# Patient Record
Sex: FEMALE | Race: WHITE | NOT HISPANIC OR LATINO | URBAN - METROPOLITAN AREA
[De-identification: names, ages, dates, MRNs, and addresses within clinical notes are randomized per-mention and may not be internally consistent; named-entity substitution may affect disease eponyms.]

---

## 2018-12-21 DIAGNOSIS — O09.299 HISTORY OF CERVICAL INCOMPETENCE IN PREGNANCY, CURRENTLY PREGNANT: Primary | ICD-10-CM

## 2019-01-09 ENCOUNTER — OFFICE VISIT (OUTPATIENT)
Dept: MATERNAL FETAL MEDICINE | Facility: CLINIC | Age: 24
End: 2019-01-09
Payer: OTHER GOVERNMENT

## 2019-01-09 VITALS
HEIGHT: 65 IN | WEIGHT: 138 LBS | DIASTOLIC BLOOD PRESSURE: 62 MMHG | BODY MASS INDEX: 22.99 KG/M2 | SYSTOLIC BLOOD PRESSURE: 101 MMHG

## 2019-01-09 DIAGNOSIS — Z36.89 ENCOUNTER FOR FETAL ANATOMIC SURVEY: Primary | ICD-10-CM

## 2019-01-09 DIAGNOSIS — O09.299 HISTORY OF CERVICAL INCOMPETENCE IN PREGNANCY, CURRENTLY PREGNANT: ICD-10-CM

## 2019-01-09 PROCEDURE — 99202 OFFICE O/P NEW SF 15 MIN: CPT | Mod: 25,,, | Performed by: OBSTETRICS & GYNECOLOGY

## 2019-01-09 PROCEDURE — 76813 OB US NUCHAL MEAS 1 GEST: CPT | Mod: ,,, | Performed by: OBSTETRICS & GYNECOLOGY

## 2019-01-09 PROCEDURE — 76813 PR US, OB NUCHAL, TRANSABDOM/TRANSVAG, FIRST GESTATION: ICD-10-PCS | Mod: ,,, | Performed by: OBSTETRICS & GYNECOLOGY

## 2019-01-09 PROCEDURE — 99202 PR OFFICE/OUTPT VISIT, NEW, LEVL II, 15-29 MIN: ICD-10-PCS | Mod: 25,,, | Performed by: OBSTETRICS & GYNECOLOGY

## 2019-01-09 RX ORDER — DOXYLAMINE SUCCINATE 25 MG/1
TABLET ORAL
COMMUNITY
Start: 2018-11-19

## 2019-01-09 RX ORDER — ALBUTEROL SULFATE 90 UG/1
AEROSOL, METERED RESPIRATORY (INHALATION)
COMMUNITY
Start: 2018-11-19

## 2019-01-09 RX ORDER — CHOLECALCIFEROL (VITAMIN D3) 50 MCG
TABLET ORAL
COMMUNITY
Start: 2018-11-19

## 2019-01-09 NOTE — LETTER
January 9, 2019      Estrella Cabral, CNM  [address] [facility]  [place] [address]           [place] [facility]  [address] , [address]  [place] [address]  Phone: [phone]          Patient: Carolyne Fry   MR Number: [id number]   YOB: 1995   Date of Visit: 1/9/2019       Dear Estrella Cabral:    Thank you for referring Carolyne Fry to me for evaluation. Attached you will find relevant portions of my assessment and plan of care.    If you have questions, please do not hesitate to call me. I look forward to following Carolyne Fry along with you.    Sincerely,    Rivka Lim MD    Enclosure  CC:  No Recipients    If you would like to receive this communication electronically, please contact [email or web address] or [phone] to request more information on [employer] Link access.    For providers and/or their staff who would like to refer a patient to [facility], please contact us through our one-stop-shop provider referral line, [facility] , at [phone].    If you feel you have received this communication in error or would no longer like to receive these types of communications, please e-mail [email or web address]

## 2019-01-09 NOTE — PROGRESS NOTES
"Indication  ========    Consultation: prior cerclage.    History  ======    General History  Blood group: A  Rhesus: Rh positive  Previous Outcomes  Preg. no. 1  Outcome: Live YOB: 2015  Gest. age 42 w + 0 d  Gender: female  Details: c/s, FTP, Cerclage placed at 16 weeks, blood transfusion, PP hemorrhage   2  Para 1  Crump children born living (T) 1  Crump children born (T) 1  Crump living children (L) 1  Risk Factors  History risk factors: Asthma  Details: hx of depression  Details: hx of cerclage with prior pregnancy, PP hemorrhage  Details: Thyroid disease  Details: seizures    Pregnancy History  ==============    Maternal Lab Tests  Result: being done at [facility]    Maternal Assessment  =================    Weight 63 kg  Weight (lb) 138 lb  BP syst 101 mmHg  BP diast 62 mmHg    Method  ======    Transabdominal ultrasound examination, 2D Color Doppler, Malvin iU22. View: Good view.    Pregnancy  =========    Crump pregnancy. Number of fetuses: 1.    Dating  ======    GA by "stated dating" 13 w + 4 d  BARB by "stated dating": 2019  Ultrasound examination on: 2019  GA by U/S based upon: CRL  GA by U/S 14 w + 2 d  BARB by U/S: 2019  Assigned: Dating performed on 2019, based on the external assessment  Assigned GA 13 w + 4 d  Assigned BARB: 2019    General Evaluation  ==============    Cardiac activity: present.    Fetal Biometry  ============    CRL 83.4 mm 14w 2d Hadlock   bpm  Other: too late to perform Nuchal Translucency    Fetal Anatomy  ===========    The following structures appear normal:  Cranium.    The following structures were visualized:  Arms. Legs.    Maternal Structures  ===============    Uterus / Cervix  Cervical length 62.0 mm    Consultation  ==========    138#  5'5"    PNV, vit D, Unisom, albuterol  PMH: Asthma, depression, PPH (2 units prbc during CD), thyroid, ? seizures  PSH: CD, cerclage  SH: Neg  FH: Denies  [place]  OB:  -CS at 42 weeks for FTD past 4-5 cm with fetal bradycardia.  Early in pregnancy (late first trimester), Dr. Wilcox followed cervical lengths and was concerned about funneling. Advised for cerclage which  was placed at 14 weeks per op note. Per op note, patient was noted to be 1 cm dilated. Patient thinks cerclage was placed at 16 weeks. Had   contractions during pregnancy but never labored through cerclage. Delivered post-dates and was sectioned for failure to progress.  Discussed if her pregnancy was complicated by funneling and second trimester cervical dilation, she most likely has cervical insufficiency.  Patient inquired about progesterone. As she did not deliver  and the cerclage did not require  removal for labor or rupture of  membranes, I do not think she needs progesterone therapy. Patient counseled re: history indicated cerclage and the risks of pain, bleeding,  infection, damage to adjacent structures, damage to cervix, risk of PROM, and potential failure to prevent a  delivery. She has lots of  concerns re: anesthesia and required general anesthesia. Discussed she will be assessed by anesthesia on day of surgery but regional  anesthesia will be safest most likely.  Recommend pelvic rest and cerclage removal at 36-37 weeks or earlier if indicated.  Time  I overall spent approximately 20 minutes in face to face time with the patient and her family, greater than 50% of which was in counseling and  care coordination.    Impression  =========    Crump, living IUP.  CRL measures consistent with EDC.  NT cannot be measured due to CRL measurements.    Recommendation  ==============    Recommend integrated screen with primary OB.  Patient scheduled for history indicated cerclage placement.  MFM will perform anatomy survey at 19 weeks.

## 2019-01-16 ENCOUNTER — HOSPITAL ENCOUNTER (OUTPATIENT)
Facility: OTHER | Age: 24
Discharge: HOME | End: 2019-01-16
Attending: OBSTETRICS & GYNECOLOGY | Admitting: OBSTETRICS & GYNECOLOGY
Payer: OTHER GOVERNMENT

## 2019-01-16 VITALS
SYSTOLIC BLOOD PRESSURE: 97 MMHG | BODY MASS INDEX: 22.99 KG/M2 | TEMPERATURE: 98 F | WEIGHT: 138 LBS | HEART RATE: 62 BPM | OXYGEN SATURATION: 99 % | DIASTOLIC BLOOD PRESSURE: 58 MMHG | HEIGHT: 65 IN | RESPIRATION RATE: 16 BRPM

## 2019-01-16 DIAGNOSIS — O34.30 CERVICAL INSUFFICIENCY IN PREGNANCY, ANTEPARTUM: Primary | ICD-10-CM

## 2019-01-16 DIAGNOSIS — O34.30 CERVICAL INSUFFICIENCY IN PREGNANCY, ANTEPARTUM: ICD-10-CM

## 2019-01-16 LAB
ABO + RH BLD: NORMAL
BASOPHILS # BLD AUTO: 0.01 K/UL
BASOPHILS NFR BLD: 0.2 %
BLD GP AB SCN CELLS X3 SERPL QL: NORMAL
DIFFERENTIAL METHOD: ABNORMAL
EOSINOPHIL # BLD AUTO: 0 K/UL
EOSINOPHIL NFR BLD: 0.6 %
ERYTHROCYTE [DISTWIDTH] IN BLOOD BY AUTOMATED COUNT: 13 %
HCT VFR BLD AUTO: 35.2 %
HGB BLD-MCNC: 11.8 G/DL
LYMPHOCYTES # BLD AUTO: 1.3 K/UL
LYMPHOCYTES NFR BLD: 27 %
MCH RBC QN AUTO: 31.7 PG
MCHC RBC AUTO-ENTMCNC: 33.5 G/DL
MCV RBC AUTO: 95 FL
MONOCYTES # BLD AUTO: 0.4 K/UL
MONOCYTES NFR BLD: 8 %
NEUTROPHILS # BLD AUTO: 3.1 K/UL
NEUTROPHILS NFR BLD: 64 %
PLATELET # BLD AUTO: 138 K/UL
PMV BLD AUTO: 10.5 FL
RBC # BLD AUTO: 3.72 M/UL
WBC # BLD AUTO: 4.77 K/UL

## 2019-01-16 PROCEDURE — S0028 INJECTION, FAMOTIDINE, 20 MG: HCPCS | Performed by: STUDENT IN AN ORGANIZED HEALTH CARE EDUCATION/TRAINING PROGRAM

## 2019-01-16 PROCEDURE — 36004724 HC OB OR TIME LEV III - 1ST 15 MIN: Performed by: OBSTETRICS & GYNECOLOGY

## 2019-01-16 PROCEDURE — 00948 ANES VAG PX CRV CERCLAGE: CPT | Performed by: OBSTETRICS & GYNECOLOGY

## 2019-01-16 PROCEDURE — 37000008 HC ANESTHESIA 1ST 15 MINUTES: Performed by: OBSTETRICS & GYNECOLOGY

## 2019-01-16 PROCEDURE — 51702 INSERT TEMP BLADDER CATH: CPT

## 2019-01-16 PROCEDURE — 59320 PR REVISION CERVIX W PREG,VAG APPRCH: ICD-10-PCS | Mod: ,,, | Performed by: OBSTETRICS & GYNECOLOGY

## 2019-01-16 PROCEDURE — 71000033 HC RECOVERY, INTIAL HOUR

## 2019-01-16 PROCEDURE — 85025 COMPLETE CBC W/AUTO DIFF WBC: CPT

## 2019-01-16 PROCEDURE — 59320 REVISION OF CERVIX: CPT | Mod: ,,, | Performed by: OBSTETRICS & GYNECOLOGY

## 2019-01-16 PROCEDURE — 25000003 PHARM REV CODE 250: Performed by: STUDENT IN AN ORGANIZED HEALTH CARE EDUCATION/TRAINING PROGRAM

## 2019-01-16 PROCEDURE — 86901 BLOOD TYPING SEROLOGIC RH(D): CPT

## 2019-01-16 PROCEDURE — G0378 HOSPITAL OBSERVATION PER HR: HCPCS

## 2019-01-16 PROCEDURE — 37000009 HC ANESTHESIA EA ADD 15 MINS: Performed by: OBSTETRICS & GYNECOLOGY

## 2019-01-16 PROCEDURE — 36004725 HC OB OR TIME LEV III - EA ADD 15 MIN: Performed by: OBSTETRICS & GYNECOLOGY

## 2019-01-16 PROCEDURE — 71000039 HC RECOVERY, EACH ADD'L HOUR: Performed by: OBSTETRICS & GYNECOLOGY

## 2019-01-16 RX ORDER — INDOMETHACIN 25 MG/1
50 CAPSULE ORAL ONCE
Status: COMPLETED | OUTPATIENT
Start: 2019-01-16 | End: 2019-01-16

## 2019-01-16 RX ORDER — SODIUM CITRATE AND CITRIC ACID MONOHYDRATE 334; 500 MG/5ML; MG/5ML
30 SOLUTION ORAL
Status: DISCONTINUED | OUTPATIENT
Start: 2019-01-16 | End: 2019-01-16 | Stop reason: HOSPADM

## 2019-01-16 RX ORDER — FAMOTIDINE 10 MG/ML
20 INJECTION INTRAVENOUS
Status: DISCONTINUED | OUTPATIENT
Start: 2019-01-16 | End: 2019-01-16 | Stop reason: HOSPADM

## 2019-01-16 RX ORDER — INDOMETHACIN 25 MG/1
25 CAPSULE ORAL EVERY 6 HOURS
Qty: 3 CAPSULE | Refills: 0 | Status: SHIPPED | OUTPATIENT
Start: 2019-01-16 | End: 2019-01-17

## 2019-01-16 RX ORDER — SODIUM CHLORIDE, SODIUM LACTATE, POTASSIUM CHLORIDE, CALCIUM CHLORIDE 600; 310; 30; 20 MG/100ML; MG/100ML; MG/100ML; MG/100ML
INJECTION, SOLUTION INTRAVENOUS CONTINUOUS
Status: DISCONTINUED | OUTPATIENT
Start: 2019-01-16 | End: 2019-01-16 | Stop reason: HOSPADM

## 2019-01-16 RX ADMIN — SODIUM CHLORIDE, SODIUM LACTATE, POTASSIUM CHLORIDE, AND CALCIUM CHLORIDE: .6; .31; .03; .02 INJECTION, SOLUTION INTRAVENOUS at 10:01

## 2019-01-16 RX ADMIN — INDOMETHACIN 50 MG: 25 CAPSULE ORAL at 12:01

## 2019-01-16 RX ADMIN — FAMOTIDINE 20 MG: 10 INJECTION, SOLUTION INTRAVENOUS at 10:01

## 2019-01-16 RX ADMIN — SODIUM CITRATE AND CITRIC ACID MONOHYDRATE 30 ML: 500; 334 SOLUTION ORAL at 10:01

## 2019-01-16 NOTE — DISCHARGE SUMMARY
[facility]  Brief Op Note/Discharge Note  Short Stay    Admit Date: 1/16/2019    Discharge Date: 01/16/2019    Attending Physician: No att. providers found     Surgery Date: 1/16/2019     Surgeon(s) and Role:     * Shahab Ayala MD - Primary    Assisting Surgeon: Raul Garg MD PGY2    Pre-op Diagnosis:  History of cerclage    Post-op Diagnosis:  Same as above    Procedure(s) (LRB):  CERCLAGE, CERVIX (Bilateral)    Anesthesia: Spinal/Epidural    Findings/Key Components:   Please see op note for full report  Cerclage placed without difficulty  Stitch @ 12 o'clock    Estimated Blood Loss: Minimal         Specimens:   Specimen (12h ago, onward)    None          Discharge Provider: Raul Garg    Diagnoses:  Active Hospital Problems    Diagnosis  POA    *History of cervical insufficiency in pregnancy, antepartum [O34.30]  Unknown      Resolved Hospital Problems   No resolved problems to display.       Discharged Condition: good    Hospital Course:   Patient was admitted for outpatient procedure as above, and tolerated the procedure well with no complications. Please see operative report for further details. Following the procedure, the patient was transferred to the recovery area in stable condition. She was discharged to home once ambulating, voiding, tolerating PO intake, and pain was well-controlled. Patient was given routine post-op instructions and was instructed to follow up in [place] in 2-3 weeks.    Final Diagnoses: Same as principal problem.    Disposition: Home or Self Care    Follow up/Patient Instructions:    Medications:  Reconciled Home Medications:      Medication List      START taking these medications    indomethacin 25 MG capsule  Commonly known as:  INDOCIN  Take 1 capsule (25 mg total) by mouth every 6 (six) hours. for 3 doses        CONTINUE taking these medications    cholecalciferol (vitamin D3) 2,000 unit Tab  Commonly known as:  VITAMIN D3     PRENATAL ORAL  Take 1 tablet by  mouth.     PROAIR HFA 90 mcg/actuation inhaler  Generic drug:  albuterol     UNISOM (DOXYLAMINE) 25 mg tablet  Generic drug:  doxylamine succinate          Discharge Procedure Orders   Diet Adult Regular     Other restrictions (specify):   Order Comments: Nothing in the vagina - no tampons, no intercourse     Notify your health care provider if you experience any of the following:  temperature >100.4     Notify your health care provider if you experience any of the following:  persistent nausea and vomiting or diarrhea     Notify your health care provider if you experience any of the following:  severe uncontrolled pain     Notify your health care provider if you experience any of the following:  difficulty breathing or increased cough     Notify your health care provider if you experience any of the following:  severe persistent headache     Notify your health care provider if you experience any of the following:  worsening rash     Notify your health care provider if you experience any of the following:  persistent dizziness, light-headedness, or visual disturbances     Notify your health care provider if you experience any of the following:  increased confusion or weakness     Notify your health care provider if you experience any of the following:   Order Comments: Vaginal bleeding > 1 pad/hour     Activity as tolerated     Follow-up Information     your primary obgyn. Schedule an appointment as soon as possible for a visit in 2 weeks.    Why:  Post Op Check               Raul Garg MD  PGY2, OBGYN  [facility]

## 2019-01-16 NOTE — PROCEDURES
Date of operation: 01/16/2019    Pre operative diagnosis:  1. 14w4d  2. History of cervical incompetence in pregnancy, currently pregnant, second trimester    Post operative diagnosis:   The same as above     Procedure: History indicated Trejo cerclage     Surgeon: Dr. Shahab Ayala    Assistant: Dr. Raul Garg    Anesthesia: spinal    Intraoperative findings: Cervix was closed and approximately 3 cm in length at the initiation of the procedure. Fetal cardiac activity documented prior to procedure. Post cerclage placement the cervix was closed and 3.5 cm in legnth.  Post procedure transabdominal ultrasound showed fetal movement and cardiac activity.    EBL: <10 cc    Procedure: Patient taken to the operating room. After spinal anesthesia administered, patient was placed in dorsal lithotomy position.  Patient prepped and draped in usual fashion. Weighted speculum was placed in vagina. Right angle retractors were used for visualization of cervix. #1 Ethibond suture was placed in a pursestring fashion. Suture placed at 12 o'clock position of cervix and extended to approximately 9 o'clock position of cervix, followed by suture from 9 o'clock to about 6 o'clock position, followed by 6 o'clock to 3 o'clock position and back to 12 o'clock (counterclockwise purse string closure). Ligation of suture was with multiple knots at 12 o'clock position. Weighted speculum and retractors were removed from vagina.  Cervix palpated as closed. Hemostasis noted after procedure. No complications. All counts were correct x 2.  Patient tolerated the procedure well and was taken to recovery room in stable condition.   I was present for the entire procedure.

## 2019-01-16 NOTE — TRANSFER OF CARE
"Anesthesia Transfer of Care Note    Patient: Carolyne Fry    Procedure(s) Performed: Procedure(s) (LRB):   SECTION (N/A)    Patient location: PACU    Anesthesia Type: spinal    Transport from OR: Transported from OR on room air with adequate spontaneous ventilation    Post pain: adequate analgesia    Post assessment: no apparent anesthetic complications    Post vital signs: stable    Level of consciousness: awake, alert and oriented    Nausea/Vomiting: no nausea/vomiting    Complications: none          Last vitals:   Visit Vitals  BP 99/60   Pulse 75   Temp 36.5 °C (97.7 °F) (Temporal)   Resp 16   Ht 5' 5" (1.651 m)   Wt 62.6 kg (138 lb)   SpO2 100%   Breastfeeding? No   BMI 22.96 kg/m²     "

## 2019-01-16 NOTE — DISCHARGE INSTRUCTIONS
Understanding Cerclage    Cerclage is a type of surgery. It closes up the cervix. The cervix is the narrowest part of the womb (uterus). It links the uterus to the vagina. The surgery stops the cervix from widening (dilating) too early during pregnancy.  How to say it  ser-[id number]   Why cerclage is done  Cerclage is done to prevent the loss or early birth of a child. Its done if you are pregnant and have a weak or short cervix. You may not be able to carry a child to full term.  Your cervix may be weak because of an injury, such as from a past procedure like dilation. Or you may be born with a weak cervix. If you have lost a pregnancy or given birth early in the past, you are more likely to need a cerclage.  How cerclage is done  This procedure is often done on an outpatient basis. That means you can go home afterward. During the procedure:  · You are given medicine so you dont feel pain. You may be awake or asleep.  · The surgeon puts a speculum into your vagina. It helps the surgeon see the cervix better.  · Your bladder is emptied.  · The surgeon puts forceps  on the cervix to hold it in place.  · The surgeon closes up the cervix with stitches, wires, or tape.  Risks of cerclage  These include:  · Bleeding  · Infection  · Injury to the cervix  · Rupture in the uterus    Premature Labor    Premature labor ( labor) is when symptoms of labor occur before 37 weeks of pregnancy. (This is 3 weeks before your due date.) Premature labor can lead to premature delivery. This means giving birth to your baby early. Babies need at least 37 weeks of pregnancy for all the organs to develop normally. The earlier the delivery, the greater the risks to the baby.  In most cases, the cause of premature labor is unknown. But certain factors may make the problem more likely. These include:  · History of premature labor with other pregnancies  · Smoking  · Alcohol or substance abuse  · Low pre-pregnancy weight or weight gain  during pregnancy  · Short time period between pregnancies  · Being pregnant with twins, triplets, or more  · History of certain types of surgery on the cervix or uterus  · Having a short cervix  · Certain infections  There are a number of other risk factors. Ask your healthcare provider to help you understand the risk factors specific to your case. Then find out what you can do to control or reduce them.  Contractions are one of the main signs of premature labor. A contraction is different from cramping. It may feel painful and the belly (abdomen) may get hard. It can last from a few seconds to a few minutes. Some women may feel only a sense of pressure in the belly, thighs, rectum, or vagina. Some may feel only the hardening of the uterus without pain or pressure. Or there may be a constant pain the lower back, which spreads forward toward the belly.    Premature labor can often be treated with medicines. A hospital stay will likely be needed. If labor is stopped successfully and you and your baby are both healthy, you may be discharged to continue care at home.  Home care  · Ask your provider any questions you have. Be certain you understand how to care for yourself at home. Also follow all recommendations given by your healthcare providers.  · Learn the signs of premature labor. Watch for these signs when you get home.  · Limit or restrict activities as advised. This may include stopping certain physical activities and cutting back hours at work.  · Avoid doing any strenuous work. Ask family and friends for help with tasks and support at home, if needed.  · Dont smoke, drink alcohol, or use other harmful substances.  · Take steps to reduce stress.  · Report any unusual symptoms to your provider.  Follow-up care  Follow up with your healthcare provider, or as directed. Weekly visits with your provider may be needed.  When to seek medical advice  Call your healthcare provider right away if any of these  occur:  · Regular or frequent contractions, whether they are painful or not  · Pressure in the pelvis  · Pressure in the lower belly or mild cramping in your belly with or without diarrhea  · Constant low, dull backache  · Gush or slow leaking of water from your vagina  · Change in vaginal discharge (watery, mucus, or bloody)  · Any vaginal bleeding  · Decreased movement of your baby

## 2019-01-16 NOTE — H&P
HISTORY AND PHYSICAL                                                OBSTETRICS          Subjective:       Carolyne Fry is a 23 y.o.  female with IUP at 14w4d weeks gestation who presents for history indicated cerclage placement. Pt has no complaints today. This IUP is complicated by asthma and depression.     Review of Systems   Constitutional: Negative for chills and fever.   Eyes: Negative for visual disturbance.   Respiratory: Negative for shortness of breath.    Cardiovascular: Negative for chest pain.   Gastrointestinal: Negative for abdominal pain, nausea and vomiting.   Genitourinary: Negative for vaginal bleeding and vaginal discharge.   Neurological: Negative for headaches.     PMHx:   Past Medical History:   Diagnosis Date    Asthma     Depression     Seizures     Thyroid disease during pregnancy      PSHx:   Past Surgical History:   Procedure Laterality Date    CERVICAL CERCLAGE         All:   Review of patient's allergies indicates:   Allergen Reactions    Penicillins Other (See Comments)     Childhood (unknown)    Latex Itching and Rash       Meds:   Medications Prior to Admission   Medication Sig Dispense Refill Last Dose    cholecalciferol, vitamin D3, (VITAMIN D3) 2,000 unit Tab    Taking    PNV no.95/ferrous fum/folic ac (PRENATAL ORAL) Take 1 tablet by mouth.   Taking    PROAIR HFA 90 mcg/actuation inhaler    Taking    UNISOM, DOXYLAMINE, 25 mg tablet    Taking       SH:   Social History     Socioeconomic History    Marital status:      Spouse name: Not on file    Number of children: Not on file    Years of education: Not on file    Highest education level: Not on file   Social Needs    Financial resource strain: Not on file    Food insecurity - worry: Not on file    Food insecurity - inability: Not on file    Transportation needs - medical: Not on file    Transportation needs - non-medical: Not on file   Occupational History    Not on file   Tobacco Use     "Smoking status: Never Smoker    Smokeless tobacco: Never Used   Substance and Sexual Activity    Alcohol use: No     Frequency: Never    Drug use: No    Sexual activity: Not on file   Other Topics Concern    Not on file   Social History Narrative    Not on file       FH: No family history on file.    OBHx:   Obstetric History       T1      L1     SAB0   TAB0   Ectopic0   Multiple0   Live Births1       # Outcome Date GA Lbr Chip/2nd Weight Sex Delivery Anes PTL Lv   2 Current            1 Term 16 42w0d  3.685 kg (8 lb 2 oz) F CS-Unspec   DAY      Complications: Failure to Progress in First Stage,Maternal blood transfusion          Objective:       BP (!) 109/58   Pulse 75   Temp 97.7 °F (36.5 °C) (Temporal)   Resp 16   Ht 5' 5" (1.651 m)   Wt 62.6 kg (138 lb)   SpO2 100%   Breastfeeding? No   BMI 22.96 kg/m²     Vitals:    19 0924 19 0953   BP: (!) 109/58    Pulse: 75    Resp: 16    Temp: 97.7 °F (36.5 °C)    TempSrc: Temporal    SpO2: 100%    Weight:  62.6 kg (138 lb)   Height:  5' 5" (1.651 m)     Physical Exam  A&Ox3, NAD  nonlabored breathing, no respiratory distress  Abd soft, nontender, nondistended  No LE edema  Appropriate mood & affect    Heart tones verified 148    Lab Review  Blood Type A POS  Lab Results   Component Value Date    WBC 4.77 2019    HGB 11.8 (L) 2019    HCT 35.2 (L) 2019    MCV 95 2019     (L) 2019        Assessment:       14w4d weeks gestation who presents for history indicated cerclage    Active Hospital Problems    Diagnosis  POA    *History of cervical insufficiency in pregnancy, antepartum [O34.30]  Unknown      Resolved Hospital Problems   No resolved problems to display.          Plan:      Risks, benefits, alternatives and possible complications have been discussed in detail with the patient.   - Consents signed and to chart  - Spinal per Anesthesia  - Draw CBC, T&S  - Dr. Ayala aware  - Will plan on " indocin for 24 hrs post procedure    Raul Garg MD  PGY2, OBGYN [facility]

## 2019-02-20 ENCOUNTER — PROCEDURE VISIT (OUTPATIENT)
Dept: MATERNAL FETAL MEDICINE | Facility: CLINIC | Age: 24
End: 2019-02-20
Payer: OTHER GOVERNMENT

## 2019-02-20 VITALS — BODY MASS INDEX: 23.8 KG/M2 | SYSTOLIC BLOOD PRESSURE: 111 MMHG | WEIGHT: 143 LBS | DIASTOLIC BLOOD PRESSURE: 64 MMHG

## 2019-02-20 DIAGNOSIS — Z36.89 ENCOUNTER FOR FETAL ANATOMIC SURVEY: ICD-10-CM

## 2019-02-20 PROCEDURE — 99214 PR OFFICE/OUTPT VISIT, EST, LEVL IV, 30-39 MIN: ICD-10-PCS | Mod: 25,,, | Performed by: OBSTETRICS & GYNECOLOGY

## 2019-02-20 PROCEDURE — 76817 PR US, OB, TRANSVAG APPROACH: ICD-10-PCS | Mod: ,,, | Performed by: OBSTETRICS & GYNECOLOGY

## 2019-02-20 PROCEDURE — 76816 OB US FOLLOW-UP PER FETUS: CPT | Mod: ,,, | Performed by: OBSTETRICS & GYNECOLOGY

## 2019-02-20 PROCEDURE — 76817 TRANSVAGINAL US OBSTETRIC: CPT | Mod: ,,, | Performed by: OBSTETRICS & GYNECOLOGY

## 2019-02-20 PROCEDURE — 99214 OFFICE O/P EST MOD 30 MIN: CPT | Mod: 25,,, | Performed by: OBSTETRICS & GYNECOLOGY

## 2019-02-20 PROCEDURE — 76816 PR  US,PREGNANT UTERUS,F/U,TRANSABD APP: ICD-10-PCS | Mod: ,,, | Performed by: OBSTETRICS & GYNECOLOGY

## 2019-02-20 NOTE — PROGRESS NOTES
The patient was to be seen for heike anatomy scan today but posed multiple questions during her visit.  She had questions concerning .  I reviewed her prior op note and she had a LUST C/S making her a candidate.    Note was made of the fact she had a significant PP hemorrhage.  This would increase her chance of recurrence.  She would be a candidate for TXA therapy.  She also had questions concerning delivery.  I discussed that if her delivery were  she would be transferred to [facility].  She stated a desire to potentially deliver at [facility].  I directed her to discuss this with you.    I reassured her that her cerclage was well placed and the cervix had no evidence of a funnel.      The patient was given an opportunity to ask questions about management and the diease process.  She expressed an understanding of and agreement to the above impression and plan. All questions were answered to her satisfaction.  She was given contact information to the [facility] clinic to address further concerns.      The approximate physician face-to-face time was 30 minutes. The majority of the time (>50%) was spent on counseling of the patient or coordination of care.

## 2019-04-02 ENCOUNTER — TELEPHONE (OUTPATIENT)
Dept: MATERNAL FETAL MEDICINE | Facility: CLINIC | Age: 24
End: 2019-04-02

## 2019-04-02 NOTE — TELEPHONE ENCOUNTER
"Patient called [facility] requesting that she be seen again, because she believes Dr. Mccabe told her that she is "fully [facility] care" now for the duration of this pregnancy. Patient is also requesting that Dr. Cazares send out a [place] Message to get her  home from deployment. Per Dr. Mccabe's note, no further M appointments have been made at this time.  Patient stated that she stopped seeing [facility] for primary OB care because they have not called her to set up an appointment.  RN advised patient to call [facility] today to set up an appointment.     RN spoke with Aline, the referral coordinator at [facility], that stated that the patient had several appointments made to discuss delivery questions with the MD in which case she did not show up.  RN made Aline aware that the patient will be calling today to set up her next primary OB visit.  RN also stated that if Windy would like the patient to be seen by [facility] again, please contact us.   "

## 2019-05-10 ENCOUNTER — TELEPHONE (OUTPATIENT)
Dept: MATERNAL FETAL MEDICINE | Facility: CLINIC | Age: 24
End: 2019-05-10

## 2019-05-10 NOTE — TELEPHONE ENCOUNTER
Patient called [facility] earlier today regarding possible cerclage removal per [facility]. Per patient, the cerclage was placed per Dr. Ayala and [facility] states that they will not remove the cerclage and it needs to be done per [facility].    Nurse verbalized understanding and said she would discuss with our lead nurse and call her back at [phone].    Patient verbalized understanding of information received.    Nurse discussed with AMADEO Rosales RN and nurses to discuss with Dr. Ayala next week regarding plan for patient's cerclage removal.    Nurse phoned patient accordingly to update her that early next week someone from the [facility] will call to confirm with her the plan of care.

## 2019-05-14 ENCOUNTER — TELEPHONE (OUTPATIENT)
Dept: MATERNAL FETAL MEDICINE | Facility: CLINIC | Age: 24
End: 2019-05-14

## 2019-05-14 NOTE — TELEPHONE ENCOUNTER
RN spoke to DELONTE Corona at the OB Clinic at [facility] to inquire about the patient calling [facility] to remove her cerclage. Chloe confirmed that the patient's provider notes indicate that the [facility] provider [place] 36 [place] and it is not indicated to have procedure done with [facility].     RN will call patient to make her aware.

## 2019-05-15 ENCOUNTER — TELEPHONE (OUTPATIENT)
Dept: MATERNAL FETAL MEDICINE | Facility: CLINIC | Age: 24
End: 2019-05-15

## 2019-05-15 NOTE — TELEPHONE ENCOUNTER
"RN spoke with Aline, the referral coordinator for [facility] CASTRO, after receiving notification that the patient wishes to come back to [facility]. Per Dr. Mccabe, he recommends that the patient establish care with a primary care provider at [facility] if she wishes to deliver at [facility], as there is no indication that it would be "[facility] recommended."     Aline will discuss with Jung Mckinley and call us back if she has any questions. RN also asked Aline to call if we can help explain this further to the patient.     "

## 2019-05-16 ENCOUNTER — TELEPHONE (OUTPATIENT)
Dept: MATERNAL FETAL MEDICINE | Facility: CLINIC | Age: 24
End: 2019-05-16

## 2019-05-16 NOTE — TELEPHONE ENCOUNTER
[facility] second referral sent on 5/15/19 for this patient to discuss delivery. The patient told Maj. Mckinley at her last OB visit that [facility] canceled her appointment and her consult was not done. RN clarified for Maj. Mckinley that the patient's consult had been completed and no follow up ultrasounds were made because it was not clinically indicated. Maj. Mckinley agrees and acknowledges that the patient gave her false information leading her to believe that an additional referral was needed. Maj. Mckinley asked RN to disregard second referral.